# Patient Record
Sex: MALE | Race: WHITE | URBAN - METROPOLITAN AREA
[De-identification: names, ages, dates, MRNs, and addresses within clinical notes are randomized per-mention and may not be internally consistent; named-entity substitution may affect disease eponyms.]

---

## 2018-09-19 ENCOUNTER — OFFICE VISIT (OUTPATIENT)
Dept: URBAN - METROPOLITAN AREA CLINIC 76 | Facility: CLINIC | Age: 83
End: 2018-09-19
Payer: MEDICARE

## 2018-09-19 PROCEDURE — 92012 INTRM OPH EXAM EST PATIENT: CPT | Performed by: OPHTHALMOLOGY

## 2018-09-19 RX ORDER — LATANOPROST 50 UG/ML
0.005 % SOLUTION OPHTHALMIC
Qty: 1 | Refills: 6 | Status: INACTIVE
Start: 2018-09-19 | End: 2018-09-19

## 2018-09-19 ASSESSMENT — INTRAOCULAR PRESSURE
OD: 26
OS: 25

## 2018-09-19 NOTE — IMPRESSION/PLAN
Impression: Ocular hypertension, bilateral: H40.053. OU. 
IOP too high today OU. Plan: Discussed diagnosis in detail with patient. Discussed option of starting treatment. Recommend starting Latanoprost QHS OU. Pt prefers to hold off on treatment for now. Advised of risk of permanent sight loss without treatment.

## 2018-09-19 NOTE — IMPRESSION/PLAN
Impression: Dry eye syndrome of bilateral lacrimal glands: H04.123. Plan: Discussed. Use artificial tears 3-4 times per day.

## 2018-10-19 ENCOUNTER — OFFICE VISIT (OUTPATIENT)
Dept: URBAN - METROPOLITAN AREA CLINIC 76 | Facility: CLINIC | Age: 83
End: 2018-10-19
Payer: MEDICARE

## 2018-10-19 PROCEDURE — 99213 OFFICE O/P EST LOW 20 MIN: CPT | Performed by: OPHTHALMOLOGY

## 2018-10-19 ASSESSMENT — INTRAOCULAR PRESSURE
OS: 24
OD: 24

## 2018-10-19 NOTE — IMPRESSION/PLAN
Impression: Type 2 diabetes mellitus w/o complication: F09.9. Plan: Discussed diagnosis with patient. No diabetic retinopathy. Discussed ocular and systemic benefits of blood sugar control. Will continue to monitor.

## 2018-10-19 NOTE — IMPRESSION/PLAN
Impression: Puckering of macula, right eye: H35.371. OD. Plan: Observe condition with no treatment required.

## 2018-10-19 NOTE — IMPRESSION/PLAN
Impression: Ocular hypertension, bilateral: H40.053. OU. 
IOP too high again  today OU. Plan: Discussed diagnosis in detail with patient. Recommend patient start Latanoprost QHS OU. Pt prefers to hold off on treatment for now. Advised of risk of permanent sight loss without treatment.

## 2019-02-19 ENCOUNTER — OFFICE VISIT (OUTPATIENT)
Dept: URBAN - METROPOLITAN AREA CLINIC 76 | Facility: CLINIC | Age: 84
End: 2019-02-19
Payer: MEDICARE

## 2019-02-19 DIAGNOSIS — H40.053 OCULAR HYPERTENSION, BILATERAL: Primary | ICD-10-CM

## 2019-02-19 PROCEDURE — 99213 OFFICE O/P EST LOW 20 MIN: CPT | Performed by: OPHTHALMOLOGY

## 2019-02-19 ASSESSMENT — INTRAOCULAR PRESSURE
OS: 24
OD: 24

## 2019-02-19 NOTE — IMPRESSION/PLAN
Impression: Ocular hypertension, bilateral: H40.053. OU. 
IOP OU continue to be too high Plan: Continue to monitor. Again discussed starting treatment. Patient again prefers to hold off on treatment for now. Advised of risk of permanent sight loss without treatment.

## 2019-06-25 ENCOUNTER — OFFICE VISIT (OUTPATIENT)
Dept: URBAN - METROPOLITAN AREA CLINIC 76 | Facility: CLINIC | Age: 84
End: 2019-06-25
Payer: MEDICARE

## 2019-06-25 DIAGNOSIS — Z96.1 PRESENCE OF INTRAOCULAR LENS: ICD-10-CM

## 2019-06-25 PROCEDURE — 92014 COMPRE OPH EXAM EST PT 1/>: CPT | Performed by: OPHTHALMOLOGY

## 2019-06-25 PROCEDURE — 92083 EXTENDED VISUAL FIELD XM: CPT | Performed by: OPHTHALMOLOGY

## 2019-06-25 PROCEDURE — 92133 CPTRZD OPH DX IMG PST SGM ON: CPT | Performed by: OPHTHALMOLOGY

## 2019-06-25 ASSESSMENT — INTRAOCULAR PRESSURE
OS: 24
OD: 24

## 2019-06-25 NOTE — IMPRESSION/PLAN
Impression: Ocular hypertension, bilateral: H40.053. OU. 
IOP OU continue to be too high
VF/OCT stable compared to previous. Plan: Continue to monitor. Again discussed starting treatment. Patient again prefers to hold off on treatment for now. Advised of risk of permanent sight loss without treatment.

## 2019-06-25 NOTE — IMPRESSION/PLAN
Impression: Type 2 diabetes mellitus w/o complication: W39.1. Plan: No diabetic retinopathy, no signs of neovascularization noted. Discussed ocular and systemic benefits of blood sugar control.

## 2019-10-22 ENCOUNTER — OFFICE VISIT (OUTPATIENT)
Dept: URBAN - METROPOLITAN AREA CLINIC 76 | Facility: CLINIC | Age: 84
End: 2019-10-22
Payer: MEDICARE

## 2019-10-22 DIAGNOSIS — H35.371 PUCKERING OF MACULA, RIGHT EYE: ICD-10-CM

## 2019-10-22 PROCEDURE — 99213 OFFICE O/P EST LOW 20 MIN: CPT | Performed by: OPHTHALMOLOGY

## 2019-10-22 ASSESSMENT — INTRAOCULAR PRESSURE
OD: 25
OS: 25

## 2019-10-22 NOTE — IMPRESSION/PLAN
Impression: Ocular hypertension, bilateral: H40.053. OU. 
IOP OU continues to be high. Plan: Continue to monitor. Again discussed starting treatment. Patient again prefers not to start any treatment. Advised of risk of permanent sight loss without treatment.

## 2019-10-22 NOTE — IMPRESSION/PLAN
Impression: Type 2 diabetes mellitus w/o complication: F04.0. Plan: No diabetic retinopathy, no signs of neovascularization noted. Discussed ocular and systemic benefits of blood sugar control.

## 2020-02-18 ENCOUNTER — OFFICE VISIT (OUTPATIENT)
Dept: URBAN - METROPOLITAN AREA CLINIC 76 | Facility: CLINIC | Age: 85
End: 2020-02-18
Payer: MEDICARE

## 2020-02-18 PROCEDURE — 99213 OFFICE O/P EST LOW 20 MIN: CPT | Performed by: OPHTHALMOLOGY

## 2020-02-18 ASSESSMENT — INTRAOCULAR PRESSURE
OD: 25
OS: 25

## 2020-02-18 NOTE — IMPRESSION/PLAN
Impression: Type 2 diabetes mellitus w/o complication: Y46.7. Plan: No diabetic retinopathy, no signs of neovascularization noted. Discussed ocular and systemic benefits of blood sugar control.

## 2020-06-23 ENCOUNTER — OFFICE VISIT (OUTPATIENT)
Dept: URBAN - METROPOLITAN AREA CLINIC 76 | Facility: CLINIC | Age: 85
End: 2020-06-23
Payer: MEDICARE

## 2020-06-23 PROCEDURE — 92014 COMPRE OPH EXAM EST PT 1/>: CPT | Performed by: OPHTHALMOLOGY

## 2020-06-23 PROCEDURE — 92083 EXTENDED VISUAL FIELD XM: CPT | Performed by: OPHTHALMOLOGY

## 2020-06-23 PROCEDURE — 92133 CPTRZD OPH DX IMG PST SGM ON: CPT | Performed by: OPHTHALMOLOGY

## 2020-06-23 ASSESSMENT — INTRAOCULAR PRESSURE
OD: 23
OS: 25

## 2020-06-23 NOTE — IMPRESSION/PLAN
Impression: Type 2 diabetes mellitus w/o complication: Y59.8. Plan: No diabetic retinopathy, no signs of neovascularization noted. Discussed ocular and systemic benefits of blood sugar control.

## 2020-06-23 NOTE — IMPRESSION/PLAN
Impression: Ocular hypertension, bilateral: H40.053. OU. 
IOP OU continues to be high. OCT stable compared to last.  VF OD showing fluctuations  VF OS stable compared to last. Plan: Continue to monitor. Again discussed starting treatment. Patient again prefers not to start any treatment. Advised of risk of permanent sight loss without treatment. 
patient requests 6 month Appts

## 2020-12-22 ENCOUNTER — OFFICE VISIT (OUTPATIENT)
Dept: URBAN - METROPOLITAN AREA CLINIC 76 | Facility: CLINIC | Age: 85
End: 2020-12-22
Payer: MEDICARE

## 2020-12-22 PROCEDURE — 99203 OFFICE O/P NEW LOW 30 MIN: CPT | Performed by: OPTOMETRIST

## 2020-12-22 ASSESSMENT — INTRAOCULAR PRESSURE
OS: 23
OD: 23

## 2020-12-22 NOTE — IMPRESSION/PLAN
Impression: Ocular hypertension, bilateral: H40.053. OU. 
IOP OU continues to be high. Previous OCT and VF reviewed. Dr. Molly Hammans on medical leave Plan: Continue to monitor. Again discussed starting treatment. Patient again prefers not to start any treatment. Advised of risk of permanent sight loss without treatment. patient requests 6 month Appts.

## 2021-07-15 ENCOUNTER — OFFICE VISIT (OUTPATIENT)
Dept: URBAN - METROPOLITAN AREA CLINIC 76 | Facility: CLINIC | Age: 86
End: 2021-07-15
Payer: MEDICARE

## 2021-07-15 DIAGNOSIS — H04.123 DRY EYE SYNDROME OF BILATERAL LACRIMAL GLANDS: ICD-10-CM

## 2021-07-15 DIAGNOSIS — E11.9 TYPE 2 DIABETES MELLITUS W/O COMPLICATION: ICD-10-CM

## 2021-07-15 PROCEDURE — 92133 CPTRZD OPH DX IMG PST SGM ON: CPT | Performed by: OPTOMETRIST

## 2021-07-15 PROCEDURE — 92083 EXTENDED VISUAL FIELD XM: CPT | Performed by: OPTOMETRIST

## 2021-07-15 PROCEDURE — 99214 OFFICE O/P EST MOD 30 MIN: CPT | Performed by: OPTOMETRIST

## 2021-07-15 ASSESSMENT — INTRAOCULAR PRESSURE
OD: 23
OS: 22

## 2021-07-15 NOTE — IMPRESSION/PLAN
Impression: Ocular hypertension, bilateral: H40.053. OU. 
IOP OU continues to be high. Testing done today - OCT: Normal OU. VF: Normal OU Plan: Continue to monitor. Patient again prefers not to start any treatment. Advised of risk of permanent sight loss without treatment. patient requests 6 month Appts.

## 2021-07-15 NOTE — IMPRESSION/PLAN
Impression: Type 2 diabetes mellitus w/o complication: G41.6. Plan: Rediscussed diagnosis in detail with patient. No treatment is required at this time. Emphasized blood sugar control. Call if [address] worsens. Will continue to observe condition and or symptoms, keep follow ups with primary care for glycemic management. Recommend yearly exams. Letter sent to PCP.

## 2022-02-02 ENCOUNTER — OFFICE VISIT (OUTPATIENT)
Dept: URBAN - METROPOLITAN AREA CLINIC 76 | Facility: CLINIC | Age: 87
End: 2022-02-02
Payer: MEDICARE

## 2022-02-02 PROCEDURE — 99213 OFFICE O/P EST LOW 20 MIN: CPT | Performed by: OPTOMETRIST

## 2022-02-02 ASSESSMENT — INTRAOCULAR PRESSURE
OS: 22
OD: 22

## 2022-02-02 NOTE — IMPRESSION/PLAN
Impression: Ocular hypertension, bilateral: H40.053. OU. Bilateral. Thick corneas. IOP stable OU. Plan: Discussed with patient. Continue to monitor. Patient prefers not to start any treatment. Advised of risk of permanent sight loss without treatment. Patient needs updated testing at next visit.